# Patient Record
(demographics unavailable — no encounter records)

---

## 2024-12-27 NOTE — HISTORY OF PRESENT ILLNESS
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 s/p ablation of veins in lower extremities in 11/20.  In 6/20 had 2 syncopal episodes ; s/p PVC ablation on 6/10/20. Biotel patch placed for 30 day monitoring was reportedly negative.   12/22 had CTA coronaries showed severe RI obstructive coronary artery disease, calcified plaque of indeterminate luminal stenosis in proximal LAD. Subsequent left heart catheterization with drug eluting stent x 1 to diag, drug eluting stent x 1 to LAD. Now on statin and DAPT.   Doing well.  Exercises without limitations. Reviewed most recent labs with her, LDL near goal of 55. Asymptomatic today, no chest pain, SOB, palpitations

## 2024-12-27 NOTE — DISCUSSION/SUMMARY
[Patient] : the patient [Minutes Spent: ___] : for [unfilled] ~Uminutes [___ Month(s)] : in [unfilled] month(s) [FreeTextEntry1] : ASHD, stable. LDL persistently near goal of 55 on Lipitor and Zetia,  Patient otherwise appears stable from a cardiovascular standpoint, follow-up in 6 months. Labs requested.  [EKG obtained to assist in diagnosis and management of assessed problem(s)] : EKG obtained to assist in diagnosis and management of assessed problem(s)

## 2024-12-27 NOTE — PHYSICAL EXAM
[Well Developed] : well developed [Well Nourished] : well nourished [No Acute Distress] : no acute distress [Normal Venous Pressure] : normal venous pressure [No Carotid Bruit] : no carotid bruit [Normal S1, S2] : normal S1, S2 [No Murmur] : no murmur [No Rub] : no rub [No Gallop] : no gallop [Clear Lung Fields] : clear lung fields [Good Air Entry] : good air entry [No Respiratory Distress] : no respiratory distress  [Normal Gait] : normal gait [No Edema] : no edema [No Rash] : no rash [No Skin Lesions] : no skin lesions [Moves all extremities] : moves all extremities [No Focal Deficits] : no focal deficits [Normal Speech] : normal speech [Alert and Oriented] : alert and oriented [Normal memory] : normal memory

## 2024-12-27 NOTE — CARDIOLOGY SUMMARY
[de-identified] : 12/27/24, NSR 6/18/24, NSR 7/10/23 NSR 3/21/23, NSR 1/25/22, NSR [de-identified] : 6/7/20, trace MR, normal LVEF [de-identified] : 12/20/22, 80% pLAD lesion, 90% D1. s/p NENA x 2

## 2025-04-16 NOTE — HISTORY OF PRESENT ILLNESS
[Lower back] : lower back [de-identified] : 04/16/2025: 70 year-old female patient presents for LBP. Has been on and off for the past couple of years. Was last seen by Dr. Self in July 2022. Has been taking diclofenac with relief. Pain is similar to the last visit but has worsen. Had relief from a cortisone shot in July 2022. When going from a sitting position to standing position pain will worsen. Pain travels from LB down L glute.

## 2025-04-16 NOTE — ASSESSMENT
[FreeTextEntry1] : 70 year-old female patient presents for LBP. Has been on and off for the past couple of years. When going from a sitting position to standing position pain will worsen. Pain travels from LB down L glute. XR: DDD, degen scoli, facet arthropathy, L3/4 spondylolisthesis. May 2021 MRI: L3/4 spinal stenosis  Rx for muscle and pain meds PT Rx given L TPI lumbar paraspinal administered today Updated L MRI needed for LESI PM f/u with Dr. Martin f/u 6 weeks

## 2025-04-16 NOTE — IMAGING
[No bony abnormalities] : No bony abnormalities [Facet arthropathy] : Facet arthropathy [Disc space narrowing] : Disc space narrowing [Scoliosis] : Scoliosis [Spondylolithesis] : Spondylolithesis [FreeTextEntry1] : Degen scoli and L3/4 spondylolisthesis

## 2025-04-16 NOTE — DATA REVIEWED
[MRI] : MRI [Lumbar Spine] : lumbar spine [I independently reviewed and interpreted images and report] : I independently reviewed and interpreted images and report [FreeTextEntry1] : L3/4 spinal stenosis

## 2025-04-25 NOTE — ASSESSMENT
[FreeTextEntry1] : After discussing various treatment options with the patient including but not limited to oral medications, physical therapy, exercise, modalities as well as interventional spinal injections, we have decided with the following plan:   1) Intervention Injection Therapy: I personally reviewed the MRI/CT scan images and agree with the radiologist's report. The radiological findings were discussed with the patient. The risks, benefits, contents and alternatives to injection were explained in full to the patient. Risks outlined include but are not limited to infection, sepsis, bleeding, post-dural puncture headache, nerve damage, temporary increase in pain, syncopal episode, failure to resolve symptoms, allergic reaction, symptom recurrence, and elevation of blood sugar in diabetics. Cortisone may cause immunosuppression. Patient understands the risks. All questions were answered. After discussion of options, patient requested an injection. Information regarding the injection was given to the patient. Which medications to stop prior to the injection was explained to the patient as well.   Follow up in 1-2 weeks post injection for re-evaluation. Continue Home exercises, stretching, activity modification, physical therapy, and conservative care.   Patient is presenting with acute/sub-acute radicular pain with impairment in ADLs and functionality.  The pain has not responded sufficiently to  conservative care including nsaid therapy and/or physical therapy.  There is no bleeding tendency, unstable medical condition, or systemic infection. The purpose of the spinal injections is to facilitate active therapy by providing short term relief through reduction of pain and inflammation.   Injections, by themselves, are not likely to provide long-term relief. Rather, active rehabilitation with modified work achieves long-term relief by increasing active ROM, strength and stability.   LESI L3/4 will call if no relief consider b/l lumbar MBB

## 2025-04-25 NOTE — PHYSICAL EXAM
[NL (90)] : forward flexion 90 degrees [Extension] : extension [] : no lumbar paraspinal tenderness [de-identified] : extension 10 degrees

## 2025-04-25 NOTE — HISTORY OF PRESENT ILLNESS
[Lower back] : lower back [Left Leg] : left leg [Right Leg] : right leg [Sharp] : sharp [] : yes [Intermittent] : intermittent [Household chores] : household chores [Social interactions] : social interactions [Rest] : rest [Meds] : meds [Injection therapy] : injection therapy [Standing] : standing [Walking] : walking [Bending forward] : bending forward [FreeTextEntry1] : 04/25/2025 : Patient presents for initial evaluation. She c/o   pain in low back and down back of both thighs.  Saw Dr. Self and had a TPI with some relief.  Pain down back of legs has gone.  Had hip injection with me in the past. TPI had helped her radicular pain.  Most of her pain is currently across the lower back, left worse right.   Subjective Weakness:No  Numbness/Tingling: No  Bladder/Bowel dysfunction: No   Treatments Tried:  Blood Thinners:   No Attempted modalities for current pain complaint: See above:   Medications:No    Injections: Yes- in past  Previous Spine Surgery: N/A    Imaging: MRI Lumbar Spine (4/17/25) OC:  Multilevel mild to moderate degenerative disc and joint disease most marked at L4/5  Grade I anterolosthesis of L3over L4 and L4 over L5 [FreeTextEntry7] : b/l legs  [de-identified] : getting up from sitting

## 2025-04-25 NOTE — ASSESSMENT
[FreeTextEntry1] : After discussing various treatment options with the patient including but not limited to oral medications, physical therapy, exercise, modalities as well as interventional spinal injections, we have decided with the following plan:   1) Intervention Injection Therapy: I personally reviewed the MRI/CT scan images and agree with the radiologist's report. The radiological findings were discussed with the patient. The risks, benefits, contents and alternatives to injection were explained in full to the patient. Risks outlined include but are not limited to infection, sepsis, bleeding, post-dural puncture headache, nerve damage, temporary increase in pain, syncopal episode, failure to resolve symptoms, allergic reaction, symptom recurrence, and elevation of blood sugar in diabetics. Cortisone may cause immunosuppression. Patient understands the risks. All questions were answered. After discussion of options, patient requested an injection. Information regarding the injection was given to the patient. Which medications to stop prior to the injection was explained to the patient as well.   Follow up in 1-2 weeks post injection for re-evaluation. Continue Home exercises, stretching, activity modification, physical therapy, and conservative care.    Patient is presenting with acute/sub-acute radicular pain with impairment in ADLs and functionality.  The pain has not responded sufficiently to  conservative care including nsaid therapy and/or physical therapy.  There is no bleeding tendency, unstable medical condition, or systemic infection. The purpose of the spinal injections is to facilitate active therapy by providing short term relief through reduction of pain and inflammation.   Injections, by themselves, are not likely to provide long-term relief. Rather, active rehabilitation with modified work achieves long-term relief by increasing active ROM, strength and stability.   LESI L3/4 - if no relief consider MBB.

## 2025-04-25 NOTE — HISTORY OF PRESENT ILLNESS
[FreeTextEntry1] : 04/25/2025 : Patient presents for initial evaluation. She c/o pain in low back and down back of both thighs. Saw Dr. Self and had a TPI with some relief. Pain down back of legs has gone. Had hip injection with me in the past. TPI had helped her radicular pain. Most of her pain is currently across the lower back, left worse right.  Subjective Weakness:No Numbness/Tingling: No Bladder/Bowel dysfunction: No  Treatments Tried: Blood Thinners: No Attempted modalities for current pain complaint: See above:  Medications:No Injections: Yes- in past  Previous Spine Surgery: N/A Imaging: MRI Lumbar Spine (4/17/25) OC: Multilevel mild to moderate degenerative disc and joint disease most marked at L4/5 Grade I anterolosthesis of L3over L4 and L4 over L5   Imaging: MRI Lumbar Spine (4/17/25) OC: multi leve DDD, worst at L4/5. Worsening at L4/5 with moderate b/l facet hypertrophy.

## 2025-04-25 NOTE — HISTORY OF PRESENT ILLNESS
[Lower back] : lower back [Left Leg] : left leg [Right Leg] : right leg [Sharp] : sharp [] : yes [Intermittent] : intermittent [Household chores] : household chores [Social interactions] : social interactions [Rest] : rest [Meds] : meds [Injection therapy] : injection therapy [Standing] : standing [Walking] : walking [Bending forward] : bending forward [FreeTextEntry1] : 04/25/2025 : Patient presents for initial evaluation. She c/o   pain in low back and down back of both thighs.  Saw Dr. Self and had a TPI with some relief.  Pain down back of legs has gone.  Had hip injection with me in the past. TPI had helped her radicular pain.  Most of her pain is currently across the lower back, left worse right.   Subjective Weakness:No  Numbness/Tingling: No  Bladder/Bowel dysfunction: No   Treatments Tried:  Blood Thinners:   No Attempted modalities for current pain complaint: See above:   Medications:No    Injections: Yes- in past  Previous Spine Surgery: N/A    Imaging: MRI Lumbar Spine (4/17/25) OC:  Multilevel mild to moderate degenerative disc and joint disease most marked at L4/5  Grade I anterolosthesis of L3over L4 and L4 over L5 [FreeTextEntry7] : b/l legs  [de-identified] : getting up from sitting

## 2025-05-16 NOTE — HISTORY OF PRESENT ILLNESS
[Moderate] : moderate [___ Days ago] : [unfilled] days ago [Constant] : constant [Constipation] : constipation [Anorexia] : anorexia [Abdominal Pain] : abdominal pain [Improving] : improving [Nausea] : no nausea [Vomiting] : no vomiting [de-identified] : Lower abdomen, [FreeTextEntry5] : Tylenol, Diclofenac, [FreeTextEntry4] : Moving, [FreeTextEntry8] : Pt was able to see GI a couple of days ago and had CT scan that showed sigmoid diverticulitis.  She was started on ABx 2 days later and is feeling better.  She's on Cipro 500 mg BID and Flagyl 500 mg TID. She feels better now by about 60% better.  She is on full liquid diet today and had a nice BM today.  She also has a cough for 4-5 days, cough is intermittent.  She's starting to bring up white phlegm.  She denied sore throat.  She started to have some mild nasal congestion, some fatigue but no myalgia.  She denied ear discomfort.  No CP, SOB or wheezing.

## 2025-05-16 NOTE — PHYSICAL EXAM
[No Acute Distress] : no acute distress [Well Nourished] : well nourished [Well Developed] : well developed [Normal Sclera/Conjunctiva] : normal sclera/conjunctiva [EOMI] : extraocular movements intact [Normal Outer Ear/Nose] : the outer ears and nose were normal in appearance [Normal TMs] : both tympanic membranes were normal [Supple] : supple [No Respiratory Distress] : no respiratory distress  [Clear to Auscultation] : lungs were clear to auscultation bilaterally [Normal Rate] : normal rate  [Regular Rhythm] : with a regular rhythm [Normal S1, S2] : normal S1 and S2 [No Edema] : there was no peripheral edema [Soft] : abdomen soft [Non-distended] : non-distended [Normal Bowel Sounds] : normal bowel sounds [Normal Posterior Cervical Nodes] : no posterior cervical lymphadenopathy [Normal Anterior Cervical Nodes] : no anterior cervical lymphadenopathy [No CVA Tenderness] : no CVA  tenderness [No Spinal Tenderness] : no spinal tenderness [No Joint Swelling] : no joint swelling [Grossly Normal Strength/Tone] : grossly normal strength/tone [Normal Gait] : normal gait [Speech Grossly Normal] : speech grossly normal [Normal Affect] : the affect was normal [Alert and Oriented x3] : oriented to person, place, and time [de-identified] : female in stated age, [de-identified] : no sinus tenderness, nasal turbinates were congested, the pharynx was not erythematous, [de-identified] : Mild lower abdominal tenderness on palpation, no rebound or guarding,

## 2025-05-16 NOTE — PHYSICAL EXAM
[No Acute Distress] : no acute distress [Well Nourished] : well nourished [Well Developed] : well developed [Normal Sclera/Conjunctiva] : normal sclera/conjunctiva [EOMI] : extraocular movements intact [Normal Outer Ear/Nose] : the outer ears and nose were normal in appearance [Normal TMs] : both tympanic membranes were normal [Supple] : supple [No Respiratory Distress] : no respiratory distress  [Clear to Auscultation] : lungs were clear to auscultation bilaterally [Normal Rate] : normal rate  [Regular Rhythm] : with a regular rhythm [Normal S1, S2] : normal S1 and S2 [No Edema] : there was no peripheral edema [Soft] : abdomen soft [Non-distended] : non-distended [Normal Bowel Sounds] : normal bowel sounds [Normal Posterior Cervical Nodes] : no posterior cervical lymphadenopathy [Normal Anterior Cervical Nodes] : no anterior cervical lymphadenopathy [No CVA Tenderness] : no CVA  tenderness [No Spinal Tenderness] : no spinal tenderness [No Joint Swelling] : no joint swelling [Grossly Normal Strength/Tone] : grossly normal strength/tone [Normal Gait] : normal gait [Speech Grossly Normal] : speech grossly normal [Normal Affect] : the affect was normal [Alert and Oriented x3] : oriented to person, place, and time [de-identified] : female in stated age, [de-identified] : no sinus tenderness, nasal turbinates were congested, the pharynx was not erythematous, [de-identified] : Mild lower abdominal tenderness on palpation, no rebound or guarding,

## 2025-05-19 NOTE — PHYSICAL EXAM
[NL (90)] : forward flexion 90 degrees [Extension] : extension [] : no lumbar paraspinal tenderness [de-identified] : extension 10 degrees

## 2025-05-19 NOTE — HISTORY OF PRESENT ILLNESS
[FreeTextEntry1] : 05/19/2025: follow up today for L4-5 LESI on 5/2/25 with % relief.  04/25/2025 : Patient presents for initial evaluation. She c/o pain in low back and down back of both thighs. Saw Dr. Self and had a TPI with some relief. Pain down back of legs has gone. Had hip injection with me in the past. TPI had helped her radicular pain. Most of her pain is currently across the lower back, left worse right.  Subjective Weakness:No Numbness/Tingling: No Bladder/Bowel dysfunction: No  Treatments Tried: Blood Thinners: No Attempted modalities for current pain complaint: See above:  Medications:No Injections: Yes- in past L4-5 LESI - 5/2/25  Previous Spine Surgery: N/A Imaging: MRI Lumbar Spine (4/17/25) OC: Multilevel mild to moderate degenerative disc and joint disease most marked at L4/5 Grade I anterolosthesis of L3over L4 and L4 over L5   Imaging: MRI Lumbar Spine (4/17/25) OC: multi leve DDD, worst at L4/5. Worsening at L4/5 with moderate b/l facet hypertrophy.

## 2025-06-17 NOTE — ASSESSMENT
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 with complicated sigmoid diverticulitis.  This is her first documented episode of diverticulitis.  She subsequently developed a pelvic abscess which spontaneously drained through her vaginal cuff.  Her symptoms resolved and she was discharged home on Augmentin.  She completed antibiotics a week ago.  In the office today she feels well denies pain or fever.  She is having irregular bowel movements and occasional crampy abdominal discomfort.  Examination in the office is benign.  Her last colonoscopy was in 2023 and demonstrated diverticulosis.  Her only surgical history was a laparoscopic hysterectomy for endometrial cancer.  She did not require chemotherapy or radiation.  I had a long conversation with the patient.  I explained that she has had an episode of complicated sigmoid diverticulitis and as such I would recommend an elective laparoscopic assisted sigmoid colectomy.  I explained the risks benefits and alternatives of surgery including the risks of bleeding infection possible need for an open procedure rare incidence of anastomotic leak and potential need for temporary stoma.  I explained the risk of irregular postoperative bowel movements.  I explained the risk of continued observation which includes the risk of recurrent diverticulitis and recurrent complications.  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  She is presently asymptomatic.  I ordered a CT abdomen pelvis for 3 weeks from now to be sure that the inflammatory process has resolved prior to any surgery.  Final plan for surgery will be made after repeat imaging.  I instructed her to call me in the meantime should she develop recurrent abdominal pain or fever.

## 2025-06-17 NOTE — HISTORY OF PRESENT ILLNESS
[FreeTextEntry1] : Chata is a 71 y/o female being seen for a follow-up visit, diverticulitis  PMHx HLD, GERD, CAD status post 2 stents placed on 12/2022   Colonoscopy 1/30/19 - Small polyp at 30 cm removed.  Mild diverticulosis left colon.  Small internal hemorrhoids.    Colonoscopy 7/18/23 (hematochezia) - Diverticulosis in the sigmoid colon.  Internal hemorrhoids.  The examination was otherwise normal.  No specimens collected.    CT A/P 5/14/25 = OUTPATIENT (lower abdominal pain for 4 days to assess for diverticulitis) - Acute sigmoid diverticulitis with associated pelvic fluid without organized abscess. Recommend follow-up evaluation and colonoscopy as clinically indicated. Findings discussed with the patient at the conclusion of the examination. Patient instructed to begin antibiotics prescribed by her provider.  Patient was placed on a 10 day course cipro/flagyl, after completing antibiotics had persistent diarrhea and abdominal pain restarted on a second course of cipro/flagyl.  [facility] 5/24/25-5/31/25 for worsening abdominal pain - (tenderness to palpation in the LLQ noted during physical exam) and diarrhea x 2 weeks, found to have Perforated diverticulitis with pelvic abscess.  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   CT A/P 5/26/25 (Acute diverticulitis with worsening pain) - Interval worsening/progression of acute sigmoid diverticulitis when compared with prior exam from 5/14/2025. New pericolonic abscess measuring 5.4 x 5.9 x 6.1 cm.  Today pt reports feeling well.  1 episode of diverticulitis.   Hospitalized for 1 episode, treated with IV antibiotics, discharged on oral finished taking 6/10/25, feels better.  BMs frequent lower abdominal pain, no nausea or vomiting with the attack.  Regular 4-5 times or skips a day or two, no bleeding.  Does feel swelling tissues and spotting of blood when wiping. Patient is on baby aspirin for stents x 2.

## 2025-06-17 NOTE — PHYSICAL EXAM
[Abdomen Masses] : No abdominal masses [Abdomen Tenderness] : ~T No ~M abdominal tenderness [No HSM] : no hepatosplenomegaly [de-identified] : Well-healed laparoscopic incisions [de-identified] : WNL [de-identified] : WNL [de-identified] : CATALINOL [de-identified] : WNL [de-identified] : WNL

## 2025-06-17 NOTE — HISTORY OF PRESENT ILLNESS
[FreeTextEntry1] : Chata is a 71 y/o female being seen for a follow-up visit, diverticulitis  PMHx HLD, GERD, CAD status post 2 stents placed on 12/2022   Colonoscopy 1/30/19 - Small polyp at 30 cm removed.  Mild diverticulosis left colon.  Small internal hemorrhoids.    Colonoscopy 7/18/23 (hematochezia) - Diverticulosis in the sigmoid colon.  Internal hemorrhoids.  The examination was otherwise normal.  No specimens collected.    CT A/P 5/14/25 = OUTPATIENT (lower abdominal pain for 4 days to assess for diverticulitis) - Acute sigmoid diverticulitis with associated pelvic fluid without organized abscess. Recommend follow-up evaluation and colonoscopy as clinically indicated. Findings discussed with the patient at the conclusion of the examination. Patient instructed to begin antibiotics prescribed by her provider.  Patient was placed on a 10 day course cipro/flagyl, after completing antibiotics had persistent diarrhea and abdominal pain restarted on a second course of cipro/flagyl.  [facility] 5/24/25-5/31/25 for worsening abdominal pain - (tenderness to palpation in the LLQ noted during physical exam) and diarrhea x 2 weeks, found to have Perforated diverticulitis with pelvic abscess.  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   CT A/P 5/26/25 (Acute diverticulitis with worsening pain) - Interval worsening/progression of acute sigmoid diverticulitis when compared with prior exam from 5/14/2025. New pericolonic abscess measuring 5.4 x 5.9 x 6.1 cm.  Today pt reports feeling well.  1 episode of diverticulitis.   Hospitalized for 1 episode, treated with IV antibiotics, discharged on oral finished taking 6/10/25, feels better.  BMs frequent lower abdominal pain, no nausea or vomiting with the attack.  Regular 4-5 times or skips a day or two, no bleeding.  Does feel swelling tissues and spotting of blood when wiping. Patient is on baby aspirin for stents x 2.

## 2025-06-17 NOTE — PHYSICAL EXAM
[Abdomen Masses] : No abdominal masses [Abdomen Tenderness] : ~T No ~M abdominal tenderness [No HSM] : no hepatosplenomegaly [de-identified] : Well-healed laparoscopic incisions [de-identified] : WNL [de-identified] : WNL [de-identified] : CATALINOL [de-identified] : WNL [de-identified] : WNL

## 2025-06-17 NOTE — ASSESSMENT
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  This is her first documented episode of diverticulitis.  She subsequently developed a pelvic abscess which spontaneously drained through her vaginal cuff.  Her symptoms resolved and she was discharged home on Augmentin.  She completed antibiotics a week ago.  In the office today she feels well denies pain or fever.  She is having irregular bowel movements and occasional crampy abdominal discomfort.  Examination in the office is benign.  Her last colonoscopy was in 2023 and demonstrated diverticulosis.  Her only surgical history was a laparoscopic hysterectomy for endometrial cancer.  She did not require chemotherapy or radiation.  I had a long conversation with the patient.  I explained that she has had an episode of complicated sigmoid diverticulitis and as such I would recommend an elective laparoscopic assisted sigmoid colectomy.  I explained the risks benefits and alternatives of surgery including the risks of bleeding infection possible need for an open procedure rare incidence of anastomotic leak and potential need for temporary stoma.  I explained the risk of irregular postoperative bowel movements.  I explained the risk of continued observation which includes the risk of recurrent diverticulitis and recurrent complications.  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  She is presently asymptomatic.  I ordered a CT abdomen pelvis for 3 weeks from now to be sure that the inflammatory process has resolved prior to any surgery.  Final plan for surgery will be made after repeat imaging.  I instructed her to call me in the meantime should she develop recurrent abdominal pain or fever.

## 2025-06-17 NOTE — CONSULT LETTER
[Dear  ___] : Dear  [unfilled], [Consult Letter:] : I had the pleasure of evaluating your patient, [unfilled]. [Please see my note below.] : Please see my note below. [Consult Closing:] : Thank you very much for allowing me to participate in the care of this patient.  If you have any questions, please do not hesitate to contact me. [Sincerely,] : Sincerely, [FreeTextEntry3] : Keron Barrios M.D., F.A.C.S, F.A.S.C.R.S [DrBraxton  ___] : Dr. DONALDSON [DrBraxton ___] : Dr. DONALDSON

## 2025-06-17 NOTE — ASSESSMENT
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  This is her first documented episode of diverticulitis.  She subsequently developed a pelvic abscess which spontaneously drained through her vaginal cuff.  Her symptoms resolved and she was discharged home on Augmentin.  She completed antibiotics a week ago.  In the office today she feels well denies pain or fever.  She is having irregular bowel movements and occasional crampy abdominal discomfort.  Examination in the office is benign.  Her last colonoscopy was in 2023 and demonstrated diverticulosis.  Her only surgical history was a laparoscopic hysterectomy for endometrial cancer.  She did not require chemotherapy or radiation.  I had a long conversation with the patient.  I explained that she has had an episode of complicated sigmoid diverticulitis and as such I would recommend an elective laparoscopic assisted sigmoid colectomy.  I explained the risks benefits and alternatives of surgery including the risks of bleeding infection possible need for an open procedure rare incidence of anastomotic leak and potential need for temporary stoma.  I explained the risk of irregular postoperative bowel movements.  I explained the risk of continued observation which includes the risk of recurrent diverticulitis and recurrent complications.  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  She is presently asymptomatic.  I ordered a CT abdomen pelvis for 3 weeks from now to be sure that the inflammatory process has resolved prior to any surgery.  Final plan for surgery will be made after repeat imaging.  I instructed her to call me in the meantime should she develop recurrent abdominal pain or fever.

## 2025-06-17 NOTE — PHYSICAL EXAM
[Abdomen Masses] : No abdominal masses [Abdomen Tenderness] : ~T No ~M abdominal tenderness [No HSM] : no hepatosplenomegaly [de-identified] : Well-healed laparoscopic incisions [de-identified] : WNL [de-identified] : WNL [de-identified] : CATALINOL [de-identified] : WNL [de-identified] : WNL

## 2025-06-17 NOTE — HISTORY OF PRESENT ILLNESS
[FreeTextEntry1] : Chata is a 71 y/o female being seen for a follow-up visit, diverticulitis  PMHx HLD, GERD, CAD status post 2 stents placed on 12/2022   Colonoscopy 1/30/19 - Small polyp at 30 cm removed.  Mild diverticulosis left colon.  Small internal hemorrhoids.    Colonoscopy 7/18/23 (hematochezia) - Diverticulosis in the sigmoid colon.  Internal hemorrhoids.  The examination was otherwise normal.  No specimens collected.    CT A/P 5/14/25 = OUTPATIENT (lower abdominal pain for 4 days to assess for diverticulitis) - Acute sigmoid diverticulitis with associated pelvic fluid without organized abscess. Recommend follow-up evaluation and colonoscopy as clinically indicated. Findings discussed with the patient at the conclusion of the examination. Patient instructed to begin antibiotics prescribed by her provider.  Patient was placed on a 10 day course cipro/flagyl, after completing antibiotics had persistent diarrhea and abdominal pain restarted on a second course of cipro/flagyl.  [facility] 5/24/25-5/31/25 for worsening abdominal pain - (tenderness to palpation in the LLQ noted during physical exam) and diarrhea x 2 weeks, found to have Perforated diverticulitis with pelvic abscess.  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   CT A/P 5/26/25 (Acute diverticulitis with worsening pain) - Interval worsening/progression of acute sigmoid diverticulitis when compared with prior exam from 5/14/2025. New pericolonic abscess measuring 5.4 x 5.9 x 6.1 cm.  Today pt reports feeling well.  1 episode of diverticulitis.   Hospitalized for 1 episode, treated with IV antibiotics, discharged on oral finished taking 6/10/25, feels better.  BMs frequent lower abdominal pain, no nausea or vomiting with the attack.  Regular 4-5 times or skips a day or two, no bleeding.  Does feel swelling tissues and spotting of blood when wiping. Patient is on baby aspirin for stents x 2.